# Patient Record
Sex: FEMALE | Race: ASIAN | NOT HISPANIC OR LATINO | Employment: UNEMPLOYED | URBAN - METROPOLITAN AREA
[De-identification: names, ages, dates, MRNs, and addresses within clinical notes are randomized per-mention and may not be internally consistent; named-entity substitution may affect disease eponyms.]

---

## 2018-02-24 ENCOUNTER — HOSPITAL ENCOUNTER (OUTPATIENT)
Dept: MEDSURG UNIT | Facility: CLINIC | Age: 38
Discharge: HOME | End: 2018-02-24
Attending: OBSTETRICS & GYNECOLOGY | Admitting: OBSTETRICS & GYNECOLOGY

## 2018-02-24 ASSESSMENT — MIFFLIN-ST. JEOR: SCORE: 1321.79

## 2018-02-28 ENCOUNTER — ANESTHESIA - HEALTHEAST (OUTPATIENT)
Dept: OBGYN | Facility: CLINIC | Age: 38
End: 2018-02-28

## 2018-03-02 ENCOUNTER — HOME CARE/HOSPICE - HEALTHEAST (OUTPATIENT)
Dept: HOME HEALTH SERVICES | Facility: HOME HEALTH | Age: 38
End: 2018-03-02

## 2018-03-03 ENCOUNTER — HOME CARE/HOSPICE - HEALTHEAST (OUTPATIENT)
Dept: HOME HEALTH SERVICES | Facility: HOME HEALTH | Age: 38
End: 2018-03-03

## 2018-03-20 ENCOUNTER — COMMUNICATION - HEALTHEAST (OUTPATIENT)
Dept: HEALTH INFORMATION MANAGEMENT | Facility: CLINIC | Age: 38
End: 2018-03-20

## 2018-03-20 ENCOUNTER — COMMUNICATION - HEALTHEAST (OUTPATIENT)
Dept: TELEHEALTH | Facility: CLINIC | Age: 38
End: 2018-03-20

## 2018-04-12 ENCOUNTER — PATIENT OUTREACH (OUTPATIENT)
Dept: CARE COORDINATION | Facility: CLINIC | Age: 38
End: 2018-04-12

## 2018-04-12 DIAGNOSIS — O48.0 POST TERM PREGNANCY, ANTEPARTUM CONDITION OR COMPLICATION: Primary | ICD-10-CM

## 2018-05-07 ENCOUNTER — PATIENT OUTREACH (OUTPATIENT)
Dept: CARE COORDINATION | Facility: CLINIC | Age: 38
End: 2018-05-07

## 2021-04-21 ENCOUNTER — AMBULATORY - HEALTHEAST (OUTPATIENT)
Dept: OBGYN | Facility: CLINIC | Age: 41
End: 2021-04-21

## 2021-04-21 DIAGNOSIS — Z33.1 PREGNANT STATE, INCIDENTAL: ICD-10-CM

## 2021-04-27 ENCOUNTER — AMBULATORY - HEALTHEAST (OUTPATIENT)
Dept: OBGYN | Facility: CLINIC | Age: 41
End: 2021-04-27

## 2021-04-27 ENCOUNTER — AMBULATORY - HEALTHEAST (OUTPATIENT)
Dept: LAB | Facility: CLINIC | Age: 41
End: 2021-04-27

## 2021-04-27 DIAGNOSIS — Z11.59 ENCOUNTER FOR SCREENING FOR OTHER VIRAL DISEASES: ICD-10-CM

## 2021-04-27 DIAGNOSIS — Z33.1 PREGNANT STATE, INCIDENTAL: ICD-10-CM

## 2021-04-28 ENCOUNTER — ANESTHESIA - HEALTHEAST (OUTPATIENT)
Dept: OBGYN | Facility: CLINIC | Age: 41
End: 2021-04-28

## 2021-04-28 LAB
SARS-COV-2 PCR COMMENT: NORMAL
SARS-COV-2 RNA SPEC QL NAA+PROBE: NEGATIVE
SARS-COV-2 VIRUS SPECIMEN SOURCE: NORMAL

## 2021-04-29 ENCOUNTER — RECORDS - HEALTHEAST (OUTPATIENT)
Dept: ADMINISTRATIVE | Facility: OTHER | Age: 41
End: 2021-04-29

## 2021-04-29 ENCOUNTER — COMMUNICATION - HEALTHEAST (OUTPATIENT)
Dept: SCHEDULING | Facility: CLINIC | Age: 41
End: 2021-04-29

## 2021-04-29 ENCOUNTER — SURGERY - HEALTHEAST (OUTPATIENT)
Dept: OBGYN | Facility: CLINIC | Age: 41
End: 2021-04-29

## 2021-04-29 ASSESSMENT — MIFFLIN-ST. JEOR: SCORE: 1364.31

## 2021-04-30 ENCOUNTER — ANESTHESIA - HEALTHEAST (OUTPATIENT)
Dept: OBGYN | Facility: CLINIC | Age: 41
End: 2021-04-30

## 2021-05-06 ENCOUNTER — RECORDS - HEALTHEAST (OUTPATIENT)
Dept: ADMINISTRATIVE | Facility: OTHER | Age: 41
End: 2021-05-06

## 2021-05-06 ENCOUNTER — COMMUNICATION - HEALTHEAST (OUTPATIENT)
Dept: MIDWIFE SERVICES | Facility: CLINIC | Age: 41
End: 2021-05-06

## 2021-05-12 ENCOUNTER — AMBULATORY - HEALTHEAST (OUTPATIENT)
Dept: PEDIATRICS | Facility: CLINIC | Age: 41
End: 2021-05-12

## 2021-05-21 ENCOUNTER — COMMUNICATION - HEALTHEAST (OUTPATIENT)
Dept: ADMINISTRATIVE | Facility: CLINIC | Age: 41
End: 2021-05-21

## 2021-05-26 ENCOUNTER — AMBULATORY - HEALTHEAST (OUTPATIENT)
Dept: NURSING | Facility: CLINIC | Age: 41
End: 2021-05-26

## 2021-05-27 ENCOUNTER — COMMUNICATION - HEALTHEAST (OUTPATIENT)
Dept: MIDWIFE SERVICES | Facility: CLINIC | Age: 41
End: 2021-05-27

## 2021-05-27 ENCOUNTER — AMBULATORY - HEALTHEAST (OUTPATIENT)
Dept: MIDWIFE SERVICES | Facility: CLINIC | Age: 41
End: 2021-05-27

## 2021-05-27 DIAGNOSIS — O92.79 INSUFFICIENT LACTATION: ICD-10-CM

## 2021-05-27 LAB — PROLACTIN SERPL-MCNC: 250 NG/ML (ref 0–20)

## 2021-06-01 VITALS — WEIGHT: 147 LBS | BODY MASS INDEX: 25.1 KG/M2 | HEIGHT: 64 IN

## 2021-06-05 VITALS — BODY MASS INDEX: 27.94 KG/M2 | BODY MASS INDEX: 26.01 KG/M2 | HEIGHT: 63 IN | WEIGHT: 159 LBS

## 2021-06-10 ENCOUNTER — RECORDS - HEALTHEAST (OUTPATIENT)
Dept: SCHEDULING | Facility: CLINIC | Age: 41
End: 2021-06-10

## 2021-06-10 DIAGNOSIS — Z12.31 VISIT FOR SCREENING MAMMOGRAM: ICD-10-CM

## 2021-06-16 ENCOUNTER — AMBULATORY - HEALTHEAST (OUTPATIENT)
Dept: NURSING | Facility: CLINIC | Age: 41
End: 2021-06-16

## 2021-06-16 PROBLEM — Z33.1 PREGNANT STATE, INCIDENTAL: Status: ACTIVE | Noted: 2021-04-29

## 2021-06-16 PROBLEM — Z34.90 PREGNANT: Status: ACTIVE | Noted: 2021-04-30

## 2021-06-16 PROBLEM — Z23 NEED FOR HEPATITIS B VACCINATION: Status: ACTIVE | Noted: 2017-07-17

## 2021-06-16 NOTE — ANESTHESIA PROCEDURE NOTES
Epidural Block    Patient location during procedure: OB  Time Called: 2/28/2018 8:49 AM  Reason for Block:labor epidural  Staffing:  Performing  Anesthesiologist: CODY CHAPIN IV  Preanesthetic Checklist  Completed: patient identified, risks, benefits, and alternatives discussed, timeout performed, consent obtained, at patient's request, airway assessed, oxygen available, suction available, emergency drugs available and hand hygiene performed  Procedure  Patient position: sitting  Prep: ChloraPrep  Patient monitoring: continuous pulse oximetry, heart rate and blood pressure  Approach: midline  Location: L3-L4  Injection technique: MATILDE saline  Number of Attempts:1  Needle  Needle type: Familia   Needle gauge: 18 G     Catheter in Space: 4  Assessment  Sensory level:  No complications

## 2021-06-16 NOTE — ANESTHESIA POSTPROCEDURE EVALUATION
Patient: Emely Muñoz  * No procedures listed *  Anesthesia type: epidural  2  Patient location: PACU  Last vitals:   Vitals:    02/28/18 1700   BP:    Pulse:    Resp: 18   Temp: 36.9  C (98.5  F)   SpO2:      Post vital signs: stable  Level of consciousness: awake and responds to simple questions  Post-anesthesia pain: pain controlled  Post-anesthesia nausea and vomiting: no  Pulmonary: unassisted, return to baseline  Cardiovascular: stable and blood pressure at baseline  Hydration: adequate  Anesthetic events: no    QCDR Measures:  ASA# 11 - Sulma-op Cardiac Arrest: ASA11B - Patient did NOT experience unanticipated cardiac arrest  ASA# 12 - Sulma-op Mortality Rate: ASA12B - Patient did NOT die  ASA# 13 - PACU Re-Intubation Rate: ASA13B - Patient did NOT require a new airway mgmt  ASA# 10 - Composite Anes Safety: ASA10A - No serious adverse event    Additional Notes:

## 2021-06-16 NOTE — PROGRESS NOTES
Spoke to Dr. Cadena and MD aware pt came in with anca bloody spotting on her pad, no saturation. Pt denies leaking of fluid. Pt denies pain or feeling contractions. Pt arrived to unit shaking uncontrollably and stating how nervous she was. MD aware pt is luis every 1 to 4 minutes with category 1 tracing. MD deferred SVE at this time. Per MD, RN to monitor pt for 1 hour, assess bleeding, and if still spotting, OK for pt to D/C. Will continue to monitor.

## 2021-06-17 NOTE — TELEPHONE ENCOUNTER
AO put in a rx for donor milk under babies name. It was sent to [facility]. Mom called this am and said it was supposed to be sent to the [facility] and they say they do not have it. Can it be re sent and make mom aware it has been re sent and that it is under babies name.

## 2021-06-17 NOTE — TELEPHONE ENCOUNTER
Telephone call to [facility].  Spoke with pharmacy tech.  Technician states that orders for donor breast milk should be faxed to the [facility] at [phone], and not the compounding pharmacy.  Order printed and faxed to the correct fax number.[facility].  Patient notified via voicemail.

## 2021-06-17 NOTE — ANESTHESIA PROCEDURE NOTES
Epidural Block    Patient location during procedure: OB  Time Called: 4/30/2021 8:00 PM  Reason for Block:labor epidural  Staffing:  Performing  Anesthesiologist: Alley Josue MD  Preanesthetic Checklist  Completed: patient identified, risks, benefits, and alternatives discussed, timeout performed, consent obtained, at patient's request, airway assessed, oxygen available, suction available, emergency drugs available and hand hygiene performed  Procedure  Patient position: sitting  Prep: ChloraPrep  Patient monitoring: continuous pulse oximetry, heart rate and blood pressure  Approach: midline  Location: L3-L4  Injection technique: MATILDE saline  Number of Attempts:1  Needle  Needle type: [employer]jamaal   Needle gauge: 18 G     Catheter in Space: 3  Assessment  Sensory level:  No complications      Additional Notes:  Tolerated well, easily placed.

## 2021-06-17 NOTE — PROGRESS NOTES
"[facility] Lactation Visit     Assessment:     1.  difficulty in feeding at breast         Pritesh has had slow weight gain despite being supplemented with 24 kcal breast milk (fortified with gentleease formula) after every nursing session. He has gained 0.5 oz/day over the past 2 days. He does appear well hydrated. At 11 days of age today he is -5% from his birth weight. He has been followed by PCP and will have next follow up visit in 2 days. He does have a history of hyperbilirubinemia, only mild facial jaundice on exam today.       Pritesh latched appropriately to the breast today and mom did not have pain. He was able to transfer 0.7 oz total after nursing on each side for 10 minutes. Parents then offered a bottle of fortified breast milk and he took about 0.5 oz. Parents were concerned about overfeeding him; gave multiple prompts to continue to attempt to supplement Pritesh based on feeding cues he was showing. His intake may have been lower today because he had last eaten 1.5 hours prior to this appointment. Also reinforced the need to keep feeding sessions concise - no more than 10 minutes per side with nursing then aim for bottle feeding for 15 minutes or less, as parents describe one feeding session bleeding into the next at times. Discussed that if Pritesh's weight gain does not improve over the next two days, nursing him less frequently with greater emphasis on bottle feeding would likely be the next step.          Also reviewed strategies to increase mom's milk supply. She does have a history of low milk supply.     Plan:        Patient Instructions      Continue to breastfeed on demand, at least 8 times a day. Limit nursing sessions to 10 minutes per side at this point.      Offer both sides every time, and alternate which breast you start on. Latch baby deeply by making a \"breast sandwich,\" and aim your nipple for the roof of the mouth. If baby's lips are rolled inward, flip the top lip out " "with your finger, and then apply gentle downward pressure to the chin to help the lips flange out like \"fish lips.\" If you have pain that lasts beyond the initial latch-on, always restart. When sucking/swallowing frequency starts to slow down, do breast compressions/massage and tickle baby's feet to keep him alert with feeding. A diaper change between sides can be helpful to keep him alert.     Supplementation plan: Continue to supplement with expressed breast milk or donor milk + fortification with Enfamil gentleease (use the recipe Dr. Shahid recommended) after every nursing session - offer 2 oz after nursing feeding, more if he is still acting hungry.       Recommended to pump: Pumping after nursing for about 10 minutes total a few times per day can help stimulate your milk supply.     Continue to monitor output, expect at least 6 wet diapers per day.         Return in about 2 days (around 5/14/2021) for Circumcision/ weight check - with lactation as needed .              Average Infant Milk Intake by Age     Age Average milk volume per feeding (mL) Average milk volume per feeding (oz) Average 24 hour milk intake (mL) Average 24 hour milk intake (oz)   Day 1 Few drops - 5mL < tsp Up to 30 mL Up to 1 oz   Day 2 5 - 15 mL <0.5 oz - 1 TB 30 - 120 mL 1 - 4 oz   Day 3 15 - 30 mL  0.5 - 1 oz 120 - 240 mL 4 - 8 oz   Day 4 30 - 45 mL  1 - 1.5 oz 240 - 360 mL 8 - 12 oz   Day 5-7 45 - 60 mL 1.5 - 2 oz 360 - 600 mL 12 - 18 oz   Week 2-3 60 - 90 mL 2 - 3 oz 450 - 750 mL 15 - 25 oz   Months 1-6 90 - 150 mL 3 - 5 oz 750 - 1035 mL 25 - 35 oz      5/12/2021      Wt Readings from Last 1 Encounters:   05/12/21 6 lb 6.4 oz (2.903 kg) (4 %, Z= -1.74)*      * Growth percentiles are based on WHO (Boys, 0-2 years) data.      ---     The Society for Maternal-Fetal Medicine reports that there is no reason to believe that the vaccine affects the safety of breastmilk. When we have an infection or get a vaccine, our bodies make antibodies to " "fight the infection. Antibodies formed from vaccines given during pregnancy do pass into the breastmilk and then to the baby to help prevent infections. Since the vaccine does not contain the virus, there is no risk of breastmilk containing the virus. Both the Society for Maternal-Fetal Medicine and American College of Obstetrics and Gynecologists recommend lactating individuals be offered the vaccine similar to non-lactating individuals as theoretical concerns do not outweigh potential benefits. There is no need to avoid initiation or discontinue breastfeeding in patients receiving the COVID-19 vaccine.            -------------------------------------------------------------------------------------------------  Information for breastfeeding families on Increasing breastmilk supply      Frequent stimulation of the breasts, by breastfeeding or by using a breast pump, during the first few days and weeks, is essential to establish an abundant breastmilk supply. If you find your milk supply is low, try the following recommendations. If you are consistent you will likely see an improvement within a few days. Although it may take a month or more to bring your supply up to meet your baby's needs, you will see steady, gradual improvement. You will be glad that you put the time and effort into breastfeeding and so will your baby.      More breast stimulation    Breastfeed more often, at least 8-12 times per 24 hours.     Limit the use of a pacifier (so that when the baby wants to suck, they are stimulating the breasts for milk production)    Try to get in \"one more feeding\" before you go to sleep, this can be done as a \"dream feed\" where you feed your baby while they sleep.    Offer both breasts at each feeding    \"Burp and switch\" using each breast twice or three times, and using different positions    \"Top up feeds\" give a short feeding in 10-20 minutes if baby seems hungry    Empty your breasts well by massaging while the " "baby is feeding    Assure the baby is completely emptying your breasts at each feeding    Try breast massage/ compression - pushing milk to baby during a feeding     Avoid these things that are known to reduce breastmilk supply    Smoking    Caffeine (in excess - it is ok to drink your morning coffee!)     Birth control pills and injections    Decongestants, antihistamines like Benadryl, NyQuil or Sudafed. If you need relief for allergies, Zyrtec or Claritin are better choices that are less likely to decrease supply.     Severe weight loss diets. A vegan or \"keto\" diet may also decrease supply due to inadequate protein or carbohydrates.     Mints, parsley, anny in excessive amounts (avoid Altoid mints or mint tea, for example)     Use a breast pump    Consider use of a hospital grade breast pump with a double kit    Pump after feedings, up to 20 minutes after you finish nursing (so that your breasts are more full the next time baby nurses)    Rest 10-15 minutes prior to pumping, eat and drink something    Apply warmth to your breasts and massage before beginning to pump    Try \"power pumping\". Pumping 12 x a day for 2-3 days after a feeding, even for a short time OR Try pumping for 10min, resting for 10 min, pumping 10 min etc for an hour once or more times per day. It can help to relax and watch an hour-long TV show while you try this.      To make pumping easier, you can rinse and refrigerate your pump parts between feedings, storing them in a Ziploc bag or Tupperware container. Wash them well at least twice per day. If your baby is premature or immunocompromised it is a better practice to wash them after each use. Most pump parts can be washed in a  on the top rack (verify with the  first).      A \"hands-free\" pumping bra can make pumping easier. This frees your hands while you pump to do breast massage or to eat or drink while you pump.     A portable pump + \"freemie cups\" can make pumping " "easier to fit into your routine. Https://TopFun.[employer]/          Condition your let-down reflex    Play relaxing music    Imagine your baby, look at pictures of your baby, smell baby clothing or baby powder    Watch videos of your baby    Always pump in the same quiet, relaxed place, set up a routine    Do slow, deep, relaxed breathing, relax your shoulders     Mother care    Reduce stress and activity, get help    Increase fluid intake. Aim for 100 oz/day of fluids. Electrolytes (like in coconut water) may be helpful.     Eat nutritious meals, continue to take prenatal vitamins.     Back rubs stimulate nerves that serve the breasts (central part of the spine)    Increase skin-to-skin holding time with your baby, relax together    Take a warm, bath, read,meditate, and empty your mind of tasks that need to be done     Herbs, food and medications    Eat a bowl of cooked oatmeal daily    Gill's yeast or ground flax seeds, 1 teaspoon one or more times daily (try mixing into oatmeal or baking into lactation cookies)    \"Moringa\" or \"Malunggay\" is an herb that is a \"super food\" and is well tolerated and can help increase supply. This herb is available through GoLacta supplements, Provasculon (use promo code PRO20 for 20% off) or other suppliers as a powder (to mix into smoothies, for example) or capsules. Herbs unfortunately are not regulated by the FDA so you have to do your own homework and choose a brand that seems reputable.     Goat's Rue is an herbal remedy intended to help increase the glandular tissue in women's breasts. This can be a powerful galactogogue (substance to increase milk supply).     Fenugreek preparations can help some increase supply, though anecdotally others have found that it does not help their supply or even decreases supply. Because of this, I do not routinely recommend it. Use of this herb has not been formally studied. Doses of 3-5 capsules (580-610 mg) three times per day are commonly " recommended. Avoid fenugreek if you are diabetic, hypoglycemic, asthmatic or allergic to peanuts or other legumes or beans. Taken as directed, it may cause a faint maple body odor. That is to be expected and means that the herb is doing it's job. To read more about fenugreek, go to http://www.breastfeeding.com/all_about/all_about_fenugreek.html    Blessed thistle or other herbs or beverages such as Mother's Milk Tea taken as directed on the package. A reliable sources of herbs and herbal blends is Mother Love Herbals and Ivet Herbs.    Lactation cookies. By searching the internet and you will find sources for packaged cookies and recipes to make your own.     Prescription medication sometimes help increase milk supply. Metaclopromide (Reglan) has been used with limited success. Domperidone has been used with more success, but is not FDA approved in the [place].      Keep records    It is important to keep a daily log with the number of nursing + pumping sessions, amount obtained amount you are having to supplement your baby and 24 hour totals, this amount is more important that the pumped amount at each session. This will help you see your progress over the days.    Keep in touch with your health care provider so he/she can monitor your progress over the days and modify advice if necessary.      Retained placenta  If you are not seeing improvement and you are having any heavy bleeding, discuss the possibility of retained placental fragments with your MD or midwife. Small bits of the placenta can secrete enough hormones to prevent the milk from coming in.     Low thyroid  Have your health care provider check your thyroid levels. Low thyroid can affect milk supply. If you have been taking thyroid medication, have your levels checked after delivery, you may need your medication adjusted.      Other resources: http://www.lowmilksupply.org     [place] Hand Expression Video  http://[email or web address]/Breastfeeding/[email or web address]      Maximizing Milk Production Video; http://newborns.[place].[email or web address]/Breastfeeding/MaxProduction.htm                        Return in about 2 days (around 5/14/2021) for Circumcision/ weight check - with lactation as needed .        SUBJECTIVE:      Pritesh is here today with mom, Emely, and dad, Peyman, for lactation support. He is a 11 days male born at Gestational Age: 39w6d now 11 days.    He is having difficulties with feeding. He has gained 1 oz since last visit 2 days ago. He has gained approximately 0.5 oz per day over the past 2 days and is now -5% from birth weight.   .     Baby is nursing every 2-3 hours during the day for about 30-40 minutes per session. He does not always nurse on both sides every time.   Mother reports hearing audible swallows every 3-5 sucks.   Baby feeds about 7-9 times in 24 hours.   Baby is supplemented with donor breast milk offered 2 oz, takes about 1 oz after feeds (approximately 8 times per day). The most he ever takes is 30 - 35 mls at a time. This is after nursing. He is getting GentleEase added to his donor milk - 3/4 t per 2 oz of donor breast milk.   Sometimes he gets a bottle first and then nurses.   Mom is also pumping about 2 per day and gets about 30 mls per pumping session.  Number of wet diapers in 24 hours: 6  Number of stools in 24 hours: 2  Color and consistency of stools: yellow seedy  Mom noticed her breasts grew larger and areolas darkened during pregnancy and she noticed primary engorgement when her milk came in on day 9-10.        Breastfeeding Goals: Hoping to breast feed - feeling flexible - hoping to increase supply as much as possible.      Previous Breastfeeding Experience: Milk came in late, switched to formula within the first week - baby was readmitted for jaundice. Post partum depression/ anxiety with first child.   Breast-surgery: None                 Results for orders placed or  performed in visit on 21   Bilirubin,  Total   Result Value Ref Range     Bilirubin, Total 13.3 (H) 0.0 - 6.0 mg/dL     Age in Hours 157 hours         Current Outpatient Medications:      DONOR HUMAN MILK FOR SUPPLEMENTATION, To be used for supplementation., Disp: 1200 mL, Rfl: 0  No past medical history on file.  No past surgical history on file.        Family History   Problem Relation Age of Onset     Mental illness Mother           Copied from mother's history at birth            Primary care provider: Greg Shahid MD     OBJECTIVE:     Mother:   Nipples are everted, the areola is compressible, the breast is soft.      Sore nipples: Some soreness on the R side, comfortable in the office today.    Maternal depression screening: Doing well  EPDS: Referral to maternal PCP not made     Infant:      Age today: 11 days     There were no vitals filed for this visit.        Weight:       Wt Readings from Last 3 Encounters:   21 6 lb 6.4 oz (2.903 kg) (4 %, Z= -1.74)*   05/10/21 6 lb 5.5 oz (2.878 kg) (5 %, Z= -1.66)*   21 6 lb 2 oz (2.778 kg) (5 %, Z= -1.67)*      * Growth percentiles are based on WHO (Boys, 0-2 years) data.         Birthweight:  6 lb 12 oz (3.062 kg).   Today's weight:    Vitals       Vitals:     21 1058   Weight: 6 lb 6.4 oz (2.903 kg)      . This is -5% from birth weight.         Test weights:        LEFT side: 0.4 oz in 10 minutes - per mom sleepier than usual   RIGHT side: 0.3 oz     TOTAL transfer:  0.7 oz     He last nursed 1.5 hours prior to this appointment     Feeding assessment:      Digital suck assessment:  Infant draws consultant's finger into mouth, palate intact, tongue over gums, normal frenulum.      Baby can hold suction with tongue while at the breast.      Alignment: Baby's head was aligned with its trunk. Baby did face mother. Baby was in cross cradle position today.      Areolar Grasp: Baby was able to open mouth wide. Baby's lips were not  pursed. Baby's lips did flange outward. Tongue was visible just barely over bottom lip. Baby had complete seal.      Areolar Compression: Baby made rhythmic motion. There were no clicking or smacking sounds. There was no severe nipple discomfort.  Nipples appeared round after feeding.     Audible swallowing: Baby made occasional quiet sounds of swallowing: Milk supply appears low.      PHYSICAL EXAM     Gen: Alert, no acute distress.   Head: Anterior fontanelle flat and soft.   Mouth:Lips pink. Oral mucosa moist. Tongue midline (good lateralization, movement, and lift; able to extend pass lower gumline).  Palate intact. Coordinated suck.  Lungs: Clear to auscultation bilaterally.   Cardiac: Regular regular rate and rhythm, S1S2, no murmurs.  Abdomen: Soft, nontender, bowel sounds present, no hepatosplenomegaly or mass palpable. Umbilicus dry with no erythema or drainage.   : [place] stage 1 male genitalia  Skin: Intact, dry, appropriate coloring for ethnicity, mild facial jaundice.   Neuro: Appropriate muscle tone.     The visit lasted a total of 60 minutes that I spent on this visit today. This time includes pre-charting, review of the chart, and face to face time with the patient.      Completed by:   JACKIE Christianson, IBCLC, [facility], Pediatrics.  5/12/2021 11:06 AM

## 2021-06-17 NOTE — ANESTHESIA PREPROCEDURE EVALUATION
Anesthesia Evaluation        Airway   Mallampati: II   Pulmonary                           Cardiovascular    Neuro/Psych      Endo/Other    (+) pregnant     GI/Hepatic/Renal       Other findings: AMA      Dental                         Anesthesia Plan  Planned anesthetic: epidural    ASA 2     Anesthetic plan and risks discussed with: patient    Post-op plan: epidural analgesia        ANESTHESIOLOGY LABOR EPIDURAL ASSESSMENT NOTE     Chart reviewed. Patient examined.     Procedure and its risks, including headache, nerve damage, bleeding, infection, back pain, low blood pressure and non success, discussed fully with patient and family.   Patient denies history of PIH, pre-eclampsia, blood thinners, or bleeding tendencies. Patient denies history of anesthetic complications.   Opportunity for questions given.     Informed consent obtained. Pt desires to precede with this elective procedure.   Pause prior to procedure done.     Of note, sterile technique used for epidural placement. Chlorhexidine, hand foam, sterile gloves, mask and hat.    Alley Josue MD 4/30/2021

## 2021-06-17 NOTE — ANESTHESIA POSTPROCEDURE EVALUATION
Patient: Emely Muñoz  * No procedures listed *  Anesthesia type: No value filed.    Patient location: Labor and Delivery  Last vitals: No vitals data found for the desired time range.    Post vital signs: stable  Level of consciousness: awake and responds to simple questions  Post-anesthesia pain: pain controlled  Post-anesthesia nausea and vomiting: no  Pulmonary: unassisted, return to baseline  Cardiovascular: stable and blood pressure at baseline  Hydration: adequate  Anesthetic events: no    QCDR Measures:  ASA# 11 - Sulma-op Cardiac Arrest: ASA11B - Patient did NOT experience unanticipated cardiac arrest  ASA# 12 - Sulma-op Mortality Rate: ASA12B - Patient did NOT die  ASA# 13 - PACU Re-Intubation Rate: NA - No ETT / LMA used for case  ASA# 10 - Composite Anes Safety: ASA10A - No serious adverse event    Additional Notes:  Denies issues with ambulation and urination. No point back tenderness or headaches.

## 2021-06-20 ENCOUNTER — HEALTH MAINTENANCE LETTER (OUTPATIENT)
Age: 41
End: 2021-06-20

## 2021-06-25 NOTE — PROGRESS NOTES
"Assessment:   1. 3 1/2 week old infant with history of slow weight gain, now gaining well on breastfeeding with formula supplementation  2. Good latch and suck but low milk transfer in office today--continues to require supplementation  3. Mother with low milk supply:  Possible insufficient glandular tissue vs. Hormonal source  4. Mother with history of anxiety and depression    Plan:   1.  To continue to nurse baby on cue, 8-12 times each day.  Feed on one side until baby finishes swallowing.  Once swallowing slows, use breast compression to encourage more swallowing, but once there is no more active swallowing, and baby is either sleeping, coming off the breast, or just \"nibbling,\" it is OK to use a finger to take baby off the breast and move to the other breast.  Do the same on the other side.  Offer both breasts at each feeding.  It is more important to watch the baby than the clock!   2.  Pritesh needs about 20 oz of milk each day to grow well.  If he nurses at home as he did in the office today, about 5 times/day, he needs about 16 oz per day (or about 2 oz for each of 8 feedings) in supplementation, using your breastmilk as your first choice and formula or donor milk when the supply of pumped milk runs out.  You can give this after feedings, or distributed throughout the day according to his feeding cues.  3.  Consider giving Pritesh his supplement before nursing sometimes, because this can help him be calmer at the breast and give you both more satisfaction during the nursing session.  4.  Know that given breastfeeding challenges, although some families pursue all avenues and some wean to formula entirely, there are many other options for coping. These can include decreasing pumping efforts and using formula for supplementation, or setting a time boundary on continuing to \"triple feed.\"  Breastfeeding does not need to be a black-and-white choice, and you can consider what works best for your family.    5.  " Discussed value of breastfeeding even in setting of low milk supply: discussed importance of skin-to-skin time, nutritional value of breastmilk, and reassured Emely that whenever Pritesh is breastfeeding, he will always get some milk, even if he has recently fed or if you have recently pumped.  6.  Discussed use of donor milk:  Explained that diseases such as HIV and Hep B can be transmitted in breastmilk, as can some medications, so being comfortable asking intimate questions of donor is part of decision-making.  Supplied with Academy of Breastfeeding [employer]'s statement on informal milk sharing, as well as information on home pasteurization of milk.   6.  If you develop plugged ducts, or tender lumps in your breasts, use some heat and massage on your breasts, along with frequent pumping and nursing, and it should clear.  If you develop all-over feelings of illness or fever, then call your OB provider.  7.  Discussed possible causes of low milk supply, including insufficient glandular tissue and/or hormonal disruption.  Has had TSH previously checked, was normal. Offered testing of prolactin level today;  Discussed advantages and disadvantages of having this information, lack of linear relationship to actual milk supply, and lack of definitive, effective treatment other than continued milk removal.  Emely would like to proceed--PRL drawn at surge timing, about 15 min after completion of feeding.  8.  Provided with emotional support and reassurance.  9.  See pediatric provider as planned and lactation as needed.    Subjective: Emely is here today for a follow-up visit.  She and baby Pritesh have been followed for slow weight gain/low milk supply.  She is feeling very discouraged since pediatric recommendation to stop breastfeeding due to slow weight gain.  Happy that after 3 days and improved gain she was able to re-introduce breastfeeding, but would very much like to continue to nurse Pritesh more than just  "once/day.  Emely would like to have baby's Pritesh's weight and milk transfer checked today.  She does continue to have low milk supply, pumping about 1/2 oz to 1 oz/session.  She does enjoy breastfeeding and would like to continue even if supply is inadequate, to support bonding and give Pritesh as much breastmilk as possible.  She is aware she needs to continue to supplement and accepts this--wondering about using formula vs. donor milk from milk bank vs. donor milk from acquaintance.  Curious about home pasteurization of donor milk from acquaintance.     Previous Course: Induced for unstable lie;  About 11 h Pitocin after cervical ripening.  Vacuum assisted birth for FHR abnormalities.  Seen by hospital IBCLC for assistance with latch; giving donor milk as supplements after feeding due to history of late onset of Lactogenesis II.  Baby also jaundiced and required phototherapy.  Seen by this writer at 4 days postpartum, at which point baby was latching well but Fritzs milk was not yet in and baby required supplementation.  Follow-up done by RONA Salas CNP, IBCLC, the following week, and baby remained at 5% below birthweight at 11 days of age with better but still low milk transfer.  Continued supplementation in adequate amounts was recommended.    Mother's Relevant Med/Surg history: Anxiety/depression, on fluoxitine    Previous Breastfeeding Experience:  Did not note milk coming in until about 2 wks after first child;  \" Gave up\" at that time because of stress of feeding..  Seen by hospital IBCLC during labor admission and discussed pumping two times a day until birth to encourage milk supply.     Breastfeeding Goals: Exclusive breastfeeding, if possible    Infant's name: Pritesh Calderon     Infant's bday: [birth year]   Gestational age: 39w6d  Infant's birth weight: 6 # 12 oz       Mode of delivery: vacuum assisted vaginal  Infant's Provider: Dr. Shahid  Discharge weight: 6 # 4.4 oz  Recent weights:   5/6/21 on this " scale: 6 # 2.8 oz   21:  6 # 2 oz  5/10/21:  6 # 5.5 oz  21:  6 # 6.4 oz  :  7 # 0 oz  :  7 # 3.5 oz  :  7 # 7 oz    Frequency and duration of feedings: every 2 1/2 hours during the day, up to 4 hrs at night  Swallows audible per mother: yes  Numbers of feedings in 24 hours: 8-9  Number urines per day: 6-8  Number of stools per day and their color: 2-3 significant stools    Supplementation: with formula or donor milk, 2.5 - 3 oz each feeding  Pumpin-7 times/day, usually yielding about 1/2 oz, at first am pump yields about 1 oz--about 4-6 oz/day    Objective/Physical exam:     Mother: Did not noticed breast grew larger or areolas darken during pregnancy,but noticed primary engorgement beginning on day 4, and is continuing to notice increasing breast fullness    Her nipples are everted, the areola is compressible, the breast is soft and full.  Breasts are small and slightly conical in shape, but have good veining    Sore nipples: tender  EPDS: not completed today--reports she is feeling generally well other than discouragement with breastfeeding    Assessment of infant: 6.05% Weight for age percentile   Age today: 25 days  Today's weight: 7 # 10.4 oz        Amount of milk transferred from LEFT side: 0.2 oz    Amount of milk transferred from RIGHT side: 0.6 oz      Baby has full flexion of arms and legs, normal tone, behavior is alert and active, respirations are normal, skin is normal, hydration is normal, jaw is normal size and alignment, palate is normal, frenulum is normal, baby can lateralize tongue, has adequate tongue lift, and tongue can protrude past bottom gum line.    Baby thrush: none     Jaundice: none    Feeding assessment: Baby can hold suction with tongue while at the breast. Slightly restless and distressed early in feeding, but became calmer and relaxed once feeding became established.    Alignment: The baby was flex relaxed. Baby's head was aligned with its trunk. Baby did  face mother. Baby was in cross-cradle position today.     Areolar Grasp: Baby was able to open mouth wide. Baby's lips were not pursed. Baby's lips did flange outward. Tongue was visible over bottom gum. Baby had complete seal.     Areolar Compression: Baby made rhythmic motion. There were no clicking or smacking sounds. There was no severe nipple discomfort.  Nipples appeared rounded after feeding.    Audible swallowing: Baby made some quiet sounds of swallowing: There was an increase in frequency after milk ejection reflex. Milk supply is low.     /66 (Patient Site: Right Arm, Patient Position: Sitting, Cuff Size: Adult Regular)   Pulse 80   Resp 16   Breastfeeding Yes   OB History    Para Term  AB Living   2 2 2     2   SAB TAB Ectopic Multiple Live Births         0 2      # Outcome Date GA Lbr Setvo/2nd Weight Sex Delivery Anes PTL Lv   2 Term 21 39w6d 08:55 / 00:13 6 lb 12 oz (3.062 kg) M Vag-Vacuum EPI N TANYA      Complications: Fetal Intolerance   1 Term 18 41w0d  7 lb 4 oz (3.289 kg) M  EPI  TANYA       Current Outpatient Medications:      BIOTIN ORAL, Take by mouth., Disp: , Rfl:      cholecalciferol, vitamin D3, 50 mcg (2,000 unit) Tab, Take 1 tablet by mouth daily., Disp: , Rfl:      cyanocobalamin 100 MCG tablet, Take 1 tablet by mouth daily., Disp: , Rfl:      ferrous sulfate (IRON ORAL), Take by mouth., Disp: , Rfl:      FLUoxetine (PROZAC) 10 MG capsule, Take 1 capsule (10 mg total) by mouth daily. (Patient taking differently: Take 20 mg by mouth daily. ), Disp: 90 capsule, Rfl: 0     OMEGA-3/DHA/EPA/FISH OIL (FISH OIL-OMEGA-3 FATTY ACIDS) 300-1,000 mg capsule, Take 500 mg by mouth daily., Disp: , Rfl:      prenatal vitamin iron-folic acid 27mg-0.8mg (PRENATAL S) 27 mg iron- 800 mcg Tab tablet, Take 1 tablet by mouth daily., Disp: , Rfl:      acetaminophen (TYLENOL) 325 MG tablet, Take 1-2 tablets (325-650 mg total) by mouth every 4 (four) hours as needed., Disp: , Rfl:  0     docusate sodium (COLACE) 100 MG capsule, Take 1 capsule (100 mg total) by mouth daily., Disp:  , Rfl: 0     ibuprofen (ADVIL,MOTRIN) 800 MG tablet, Take 1 tablet (800 mg total) by mouth every 6 (six) hours as needed for pain., Disp: 15 tablet, Rfl: 0     saliva substitute combo no.8 (BIOTENE DRY MOUTH RINSE MM), by Mucous Membrane route., Disp: , Rfl:   Past Medical History:   Diagnosis Date     Depression     anxiety and depression h/o medication      Past Surgical History:   Procedure Laterality Date     TENDON REPAIR  2015    extensor tendon surgery at [place] left toes 2nd and 3rd     No family history on file.      Time spent:  Chart review/Pre-chartin min prior to day of service  Face-to-face visit:  74 min  Documentation:  20 min  Total time spent on day of service:  94 min    BHUMI Hall, CNM, IBCLC

## 2021-06-26 NOTE — PATIENT INSTRUCTIONS - HE
"  1.  To continue to nurse baby on cue, 8-12 times each day.  Feed on one side until baby finishes swallowing.  Once swallowing slows, use breast compression to encourage more swallowing, but once there is no more active swallowing, and baby is either sleeping, coming off the breast, or just \"nibbling,\" it is OK to use a finger to take baby off the breast and move to the other breast.  Do the same on the other side.  Offer both breasts at each feeding.  It is more important to watch the baby than the clock!   2.  Pritesh needs about 20 oz of milk each day to grow well.  If he nurses at home as he did in the office today, about 5 times/day, he needs about 16 oz per day (or about 2 oz for each of 8 feedings) in supplementation, using your breastmilk as your first choice and formula or donor milk when the supply of pumped milk runs out.  You can give this after feedings, or distributed throughout the day according to his feeding cues.  3.  Consider giving Pritesh his supplement before nursing sometimes, because this can help him be calmer at the breast and give you both more satisfaction during the nursing session.  4.  Know that given breastfeeding challenges, although some families pursue all avenues and some wean to formula entirely, there are many other options for coping. These can include decreasing pumping efforts and using formula for supplementation, or setting a time boundary on continuing to \"triple feed.\"  Breastfeeding does not need to be a black-and-white choice, and you can consider what works best for your family.    5.  Know that there is value of breastfeeding even with low milk supply: there is  importance of skin-to-skin time, and nutritional value of breastmilk is also important, even combined with formula supplementation. Whenever Pritesh is breastfeeding, he will always get some milk, even if he has recently fed or if you have recently pumped.  6.  Regarding use of donor milk: diseases such as HIV and " Hep B can be transmitted in breastmilk, as can some medications, so being comfortable asking intimate questions of donor is part of decision-making.  See the below links to the Academy of Breastfeeding Medicine's statement on informal milk sharing, as well as information on home pasteurization of milk.   6.  If you develop plugged ducts, or tender lumps in your breasts, use some heat and massage on your breasts, along with frequent pumping and nursing, and it should clear.  If you develop all-over feelings of illness or fever, then call your OB provider.  7.  There are several possible causes of low milk supply, including insufficient glandular tissue and/or hormonal disruption.  There are advantages and disadvantages of testing of prolactin level today--there is a lack of a linear relationship to actual milk supply, and lack of definitive, effective treatment other than continued milk removal.  At Neith's current age, your prolactin level should be between about 240 and 350 or so.   8.  See your pediatric provider as planned, and lactation as needed.          https://www.eatsonfeetsresources.org/how-can-breastmilk-be-pasteurized-at-home/      ______    https://abm.memberclfarmflo.net/assets/DOCUMENTS/ABM's%281891%20Position%20Statement%20on%20Informal%20Breast%20Milk%20Sharing%20for%20the%20Term%20Healthy%20Infant.pdf    ____

## 2021-07-14 ENCOUNTER — MEDICAL CORRESPONDENCE (OUTPATIENT)
Dept: HEALTH INFORMATION MANAGEMENT | Facility: CLINIC | Age: 41
End: 2021-07-14

## 2021-07-14 PROBLEM — Z34.90 PREGNANT: Status: RESOLVED | Noted: 2018-02-28 | Resolved: 2018-03-02

## 2021-09-20 ENCOUNTER — HOSPITAL ENCOUNTER (OUTPATIENT)
Dept: MAMMOGRAPHY | Facility: CLINIC | Age: 41
Discharge: HOME | End: 2021-09-20
Attending: OBSTETRICS & GYNECOLOGY | Admitting: OBSTETRICS & GYNECOLOGY
Payer: COMMERCIAL

## 2021-09-20 DIAGNOSIS — Z12.31 VISIT FOR SCREENING MAMMOGRAM: ICD-10-CM

## 2021-09-20 PROCEDURE — 77063 BREAST TOMOSYNTHESIS BI: CPT

## 2021-09-27 ENCOUNTER — MEDICAL CORRESPONDENCE (OUTPATIENT)
Dept: HEALTH INFORMATION MANAGEMENT | Facility: CLINIC | Age: 41
End: 2021-09-27

## 2021-10-01 ENCOUNTER — HOSPITAL ENCOUNTER (OUTPATIENT)
Dept: MAMMOGRAPHY | Facility: CLINIC | Age: 41
End: 2021-10-01
Attending: OBSTETRICS & GYNECOLOGY
Payer: COMMERCIAL

## 2021-10-01 ENCOUNTER — HOSPITAL ENCOUNTER (OUTPATIENT)
Dept: ULTRASOUND IMAGING | Facility: CLINIC | Age: 41
End: 2021-10-01
Attending: OBSTETRICS & GYNECOLOGY
Payer: COMMERCIAL

## 2021-10-01 DIAGNOSIS — N64.89 BREAST ASYMMETRY: ICD-10-CM

## 2021-10-01 PROCEDURE — 77061 BREAST TOMOSYNTHESIS UNI: CPT | Mod: RT

## 2021-10-01 PROCEDURE — 76642 ULTRASOUND BREAST LIMITED: CPT | Mod: RT

## 2021-10-11 ENCOUNTER — HEALTH MAINTENANCE LETTER (OUTPATIENT)
Age: 41
End: 2021-10-11

## 2021-11-14 ENCOUNTER — IMMUNIZATION (OUTPATIENT)
Dept: FAMILY MEDICINE | Facility: CLINIC | Age: 41
End: 2021-11-14
Payer: COMMERCIAL

## 2021-11-14 PROCEDURE — 90471 IMMUNIZATION ADMIN: CPT

## 2021-11-14 PROCEDURE — 90686 IIV4 VACC NO PRSV 0.5 ML IM: CPT

## 2021-12-31 ENCOUNTER — IMMUNIZATION (OUTPATIENT)
Dept: NURSING | Facility: CLINIC | Age: 41
End: 2021-12-31
Payer: COMMERCIAL

## 2021-12-31 PROCEDURE — 91300 PR COVID VAC PFIZER DIL RECON 30 MCG/0.3 ML IM: CPT

## 2021-12-31 PROCEDURE — 0004A PR COVID VAC PFIZER DIL RECON 30 MCG/0.3 ML IM: CPT

## 2022-01-12 VITALS — WEIGHT: 159 LBS | BODY MASS INDEX: 28.17 KG/M2 | HEIGHT: 63 IN

## 2022-06-01 ENCOUNTER — LAB (OUTPATIENT)
Dept: FAMILY MEDICINE | Facility: CLINIC | Age: 42
End: 2022-06-01
Attending: FAMILY MEDICINE
Payer: COMMERCIAL

## 2022-06-01 DIAGNOSIS — Z20.822 SUSPECTED 2019 NOVEL CORONAVIRUS INFECTION: ICD-10-CM

## 2022-06-01 LAB — SARS-COV-2 RNA RESP QL NAA+PROBE: POSITIVE

## 2022-06-01 PROCEDURE — U0003 INFECTIOUS AGENT DETECTION BY NUCLEIC ACID (DNA OR RNA); SEVERE ACUTE RESPIRATORY SYNDROME CORONAVIRUS 2 (SARS-COV-2) (CORONAVIRUS DISEASE [COVID-19]), AMPLIFIED PROBE TECHNIQUE, MAKING USE OF HIGH THROUGHPUT TECHNOLOGIES AS DESCRIBED BY CMS-2020-01-R: HCPCS

## 2022-06-01 PROCEDURE — U0005 INFEC AGEN DETEC AMPLI PROBE: HCPCS

## 2022-06-02 ENCOUNTER — TELEPHONE (OUTPATIENT)
Dept: NURSING | Facility: CLINIC | Age: 42
End: 2022-06-02
Payer: COMMERCIAL

## 2022-06-02 NOTE — TELEPHONE ENCOUNTER
Coronavirus (COVID-19) Notification    Caller Name (Patient, parent, daughter/son, grandparent, etc)  PATIENT    Reason for call  Notify of Positive Coronavirus (COVID-19) lab results, assess symptoms,  review [facility] recommendations    Lab Result    Lab test:  2019-nCoV rRt-PCR or SARS-CoV-2 PCR    Oropharyngeal AND/OR nasopharyngeal swabs is POSITIVE for 2019-nCoV RNA/SARS-COV-2 PCR (COVID-19 virus)      Gather patient reported symptoms   Assessment   Current Symptoms at time of phone call, reported by patient: (if no symptoms, document: No symptoms] TIRED,HEADACHE, SWEATY   Date of symptom(s) onset (if applicable) 05/31/22     If at time of call, Patients symptoms have worsened, the Patient should contact 911 or have someone drive them to Emergency Dept promptly:      If Patient calling 911, inform 911 personal that you have tested positive for the Coronavirus (COVID-19).  Place mask on and await 911 to arrive.    If Emergency Dept, If possible, please have another adult drive you to the Emergency Dept but you need to wear mask when in contact with other people.      Treatment Options:   Patient classified as COVID treatment eligible by Epic high risk algorithm: Yes  Is the patient symptomatic at the time of result notification? Yes. Was the onset of symptoms within the last 5 days? No. Was the onset of symptoms within the last 7 days? Yes  Is the patient interested in a visit with a provider to discuss treatment options?: No.  Reason patient declined:  Not that sick and don't think I will get worse (save for people who possibly need it more)      Review information with Patient    Your result was positive. This means you have COVID-19 (coronavirus).    How can I protect others?    These guidelines are for isolating before returning to work, school or .    If you DO have symptoms    Stay home and away from others     For at least 5 days after your symptoms started, AND    You are fever free for  24 hours (with no medicine that reduces fever), AND    Your other symptoms are better    Wear a mask for 10 full days anytime you are around others    If you DON'T have symptoms    Stay home and away from others for at least 5 days after your positive test    Wear a mask for 10 full days anytime you are around others    There may be different guidelines for healthcare facilities.  Please check with the specific sites before arriving.    If you have been told by a doctor that you were severely ill with COVID-19 or are immunocompromised, you should isolate for at least 10 days.    You should not go back to work until you meet the guidelines above for ending your home isolation. You don't need to be retested for COVID-19 before going back to work--studies show that you won't spread the virus if it's been at least 10 days since your symptoms started (or 20 days, if you have a weak immune system).    Employers, schools, and daycares: This is an official notice for this person's medical guidelines for returning in-person.  They must meet the above guidelines before going back to work, school or  in person.    You will receive a positive COVID-19 letter via YOUnite or the mail soon with additional self-care information (exception, no letters will be sent to presurgical/preprocedure patients).    Would you like me to review some of that information with you now?  No    If you were tested for an upcoming procedure, please contact your provider for next steps.    Carie Paulino

## 2022-07-17 ENCOUNTER — HEALTH MAINTENANCE LETTER (OUTPATIENT)
Age: 42
End: 2022-07-17

## 2022-09-25 ENCOUNTER — HEALTH MAINTENANCE LETTER (OUTPATIENT)
Age: 42
End: 2022-09-25

## 2022-11-09 ENCOUNTER — HOSPITAL ENCOUNTER (OUTPATIENT)
Dept: MAMMOGRAPHY | Facility: CLINIC | Age: 42
Discharge: HOME | End: 2022-11-09
Attending: OBSTETRICS & GYNECOLOGY | Admitting: OBSTETRICS & GYNECOLOGY
Payer: COMMERCIAL

## 2022-11-09 DIAGNOSIS — Z12.31 VISIT FOR SCREENING MAMMOGRAM: ICD-10-CM

## 2022-11-09 PROCEDURE — 77067 SCR MAMMO BI INCL CAD: CPT

## 2023-05-08 ENCOUNTER — HEALTH MAINTENANCE LETTER (OUTPATIENT)
Age: 43
End: 2023-05-08

## 2024-05-11 ENCOUNTER — HEALTH MAINTENANCE LETTER (OUTPATIENT)
Age: 44
End: 2024-05-11

## 2025-01-12 ENCOUNTER — HEALTH MAINTENANCE LETTER (OUTPATIENT)
Age: 45
End: 2025-01-12

## 2025-05-17 ENCOUNTER — HEALTH MAINTENANCE LETTER (OUTPATIENT)
Age: 45
End: 2025-05-17